# Patient Record
Sex: MALE | Race: WHITE | Employment: FULL TIME | URBAN - METROPOLITAN AREA
[De-identification: names, ages, dates, MRNs, and addresses within clinical notes are randomized per-mention and may not be internally consistent; named-entity substitution may affect disease eponyms.]

---

## 2018-06-08 ENCOUNTER — TELEPHONE (OUTPATIENT)
Dept: FAMILY MEDICINE | Facility: OTHER | Age: 63
End: 2018-06-08

## 2018-06-08 ENCOUNTER — OFFICE VISIT (OUTPATIENT)
Dept: FAMILY MEDICINE | Facility: OTHER | Age: 63
End: 2018-06-08
Payer: COMMERCIAL

## 2018-06-08 VITALS
OXYGEN SATURATION: 96 % | BODY MASS INDEX: 35.1 KG/M2 | HEART RATE: 59 BPM | WEIGHT: 237 LBS | TEMPERATURE: 97.2 F | DIASTOLIC BLOOD PRESSURE: 84 MMHG | SYSTOLIC BLOOD PRESSURE: 122 MMHG | RESPIRATION RATE: 16 BRPM | HEIGHT: 69 IN

## 2018-06-08 DIAGNOSIS — Z23 NEED FOR PROPHYLACTIC VACCINATION WITH TETANUS-DIPHTHERIA (TD): ICD-10-CM

## 2018-06-08 DIAGNOSIS — Z11.59 NEED FOR HEPATITIS C SCREENING TEST: ICD-10-CM

## 2018-06-08 DIAGNOSIS — R97.20 ELEVATED PROSTATE SPECIFIC ANTIGEN (PSA): ICD-10-CM

## 2018-06-08 DIAGNOSIS — I10 BENIGN ESSENTIAL HYPERTENSION: Primary | ICD-10-CM

## 2018-06-08 DIAGNOSIS — E78.5 HYPERLIPIDEMIA LDL GOAL <100: ICD-10-CM

## 2018-06-08 DIAGNOSIS — Z11.4 SCREENING FOR HIV (HUMAN IMMUNODEFICIENCY VIRUS): ICD-10-CM

## 2018-06-08 DIAGNOSIS — Z12.11 SCREEN FOR COLON CANCER: ICD-10-CM

## 2018-06-08 DIAGNOSIS — T78.40XA ALLERGIC STATE, INITIAL ENCOUNTER: ICD-10-CM

## 2018-06-08 DIAGNOSIS — A60.00 HERPES SIMPLEX INFECTION OF GENITOURINARY SYSTEM: ICD-10-CM

## 2018-06-08 LAB
ALBUMIN SERPL-MCNC: 3.7 G/DL (ref 3.4–5)
ALP SERPL-CCNC: 80 U/L (ref 40–150)
ALT SERPL W P-5'-P-CCNC: 56 U/L (ref 0–70)
ANION GAP SERPL CALCULATED.3IONS-SCNC: 8 MMOL/L (ref 3–14)
AST SERPL W P-5'-P-CCNC: 31 U/L (ref 0–45)
BILIRUB SERPL-MCNC: 0.5 MG/DL (ref 0.2–1.3)
BUN SERPL-MCNC: 22 MG/DL (ref 7–30)
CALCIUM SERPL-MCNC: 8.8 MG/DL (ref 8.5–10.1)
CHLORIDE SERPL-SCNC: 107 MMOL/L (ref 94–109)
CHOLEST SERPL-MCNC: 166 MG/DL
CO2 SERPL-SCNC: 28 MMOL/L (ref 20–32)
CREAT SERPL-MCNC: 1.26 MG/DL (ref 0.66–1.25)
ERYTHROCYTE [DISTWIDTH] IN BLOOD BY AUTOMATED COUNT: 14 % (ref 10–15)
GFR SERPL CREATININE-BSD FRML MDRD: 58 ML/MIN/1.7M2
GLUCOSE SERPL-MCNC: 88 MG/DL (ref 70–99)
HCT VFR BLD AUTO: 50 % (ref 40–53)
HDLC SERPL-MCNC: 46 MG/DL
HGB BLD-MCNC: 16.5 G/DL (ref 13.3–17.7)
LDLC SERPL CALC-MCNC: 104 MG/DL
MCH RBC QN AUTO: 30.5 PG (ref 26.5–33)
MCHC RBC AUTO-ENTMCNC: 33 G/DL (ref 31.5–36.5)
MCV RBC AUTO: 92 FL (ref 78–100)
NONHDLC SERPL-MCNC: 120 MG/DL
PLATELET # BLD AUTO: 191 10E9/L (ref 150–450)
POTASSIUM SERPL-SCNC: 4 MMOL/L (ref 3.4–5.3)
PROT SERPL-MCNC: 7.2 G/DL (ref 6.8–8.8)
RBC # BLD AUTO: 5.41 10E12/L (ref 4.4–5.9)
SODIUM SERPL-SCNC: 143 MMOL/L (ref 133–144)
TRIGL SERPL-MCNC: 78 MG/DL
WBC # BLD AUTO: 9.7 10E9/L (ref 4–11)

## 2018-06-08 PROCEDURE — 84154 ASSAY OF PSA FREE: CPT | Mod: 90 | Performed by: FAMILY MEDICINE

## 2018-06-08 PROCEDURE — 99000 SPECIMEN HANDLING OFFICE-LAB: CPT | Performed by: FAMILY MEDICINE

## 2018-06-08 PROCEDURE — 99204 OFFICE O/P NEW MOD 45 MIN: CPT | Performed by: FAMILY MEDICINE

## 2018-06-08 PROCEDURE — 84153 ASSAY OF PSA TOTAL: CPT | Mod: 90 | Performed by: FAMILY MEDICINE

## 2018-06-08 PROCEDURE — 36415 COLL VENOUS BLD VENIPUNCTURE: CPT | Performed by: FAMILY MEDICINE

## 2018-06-08 PROCEDURE — 80053 COMPREHEN METABOLIC PANEL: CPT | Performed by: FAMILY MEDICINE

## 2018-06-08 PROCEDURE — 85027 COMPLETE CBC AUTOMATED: CPT | Performed by: FAMILY MEDICINE

## 2018-06-08 PROCEDURE — 80061 LIPID PANEL: CPT | Performed by: FAMILY MEDICINE

## 2018-06-08 RX ORDER — HYDROCHLOROTHIAZIDE 12.5 MG/1
12.5 CAPSULE ORAL DAILY
Qty: 90 CAPSULE | Refills: 1 | Status: SHIPPED | OUTPATIENT
Start: 2018-06-08 | End: 2018-12-11

## 2018-06-08 RX ORDER — VALACYCLOVIR HYDROCHLORIDE 500 MG/1
500 TABLET, FILM COATED ORAL DAILY
Qty: 90 TABLET | Refills: 3 | Status: SHIPPED | OUTPATIENT
Start: 2018-06-08 | End: 2019-05-07

## 2018-06-08 RX ORDER — HYDROCHLOROTHIAZIDE 12.5 MG/1
12.5 CAPSULE ORAL DAILY
COMMUNITY
Start: 2018-05-14 | End: 2018-06-08

## 2018-06-08 RX ORDER — FLUTICASONE PROPIONATE 50 MCG
1 SPRAY, SUSPENSION (ML) NASAL DAILY
Qty: 1 BOTTLE | Refills: 11 | Status: SHIPPED | OUTPATIENT
Start: 2018-06-08 | End: 2019-05-25

## 2018-06-08 RX ORDER — FLUTICASONE PROPIONATE 50 MCG
1 SPRAY, SUSPENSION (ML) NASAL DAILY
COMMUNITY
Start: 2018-05-23 | End: 2018-06-08

## 2018-06-08 RX ORDER — ATORVASTATIN CALCIUM 40 MG/1
40 TABLET, FILM COATED ORAL DAILY
COMMUNITY
Start: 2011-09-06 | End: 2018-06-08

## 2018-06-08 RX ORDER — VALACYCLOVIR HYDROCHLORIDE 500 MG/1
1 TABLET, FILM COATED ORAL DAILY
COMMUNITY
Start: 2018-04-11 | End: 2018-06-08

## 2018-06-08 RX ORDER — ATORVASTATIN CALCIUM 40 MG/1
40 TABLET, FILM COATED ORAL DAILY
Qty: 90 TABLET | Refills: 1 | Status: SHIPPED | OUTPATIENT
Start: 2018-06-08 | End: 2018-12-14

## 2018-06-08 ASSESSMENT — PAIN SCALES - GENERAL: PAINLEVEL: NO PAIN (0)

## 2018-06-08 NOTE — MR AVS SNAPSHOT
After Visit Summary   6/8/2018    Hossein Diamond    MRN: [id number]           Patient Information     Date Of Birth          1955        Visit Information        Provider Department      6/8/2018 7:40 AM Tanvi Schmidt MD [facility]        Today's Diagnoses     Benign essential hypertension    -  1    Screen for colon cancer        Need for hepatitis C screening test        Screening for HIV (human immunodeficiency virus)        Need for prophylactic vaccination with tetanus-diphtheria (TD)        Hyperlipidemia LDL goal <100        Elevated prostate specific antigen (PSA)        Herpes simplex infection of genitourinary system        Allergic state, initial encounter           Follow-ups after your visit        Additional Services     GASTROENTEROLOGY ADULT REF PROCEDURE ONLY       Last Lab Result: No results found for: CR  Body mass index is 34.94 kg/(m^2).     Needed:  No  Language:  English    Patient will be contacted to schedule procedure.     Please be aware that coverage of these services is subject to the terms and limitations of your health insurance plan.  Call member services at your health plan with any benefit or coverage questions.  Any procedures must be performed at a [facility] facility OR coordinated by your clinic's referral office.    Please bring the following with you to your appointment:    (1) Any X-Rays, CTs or MRIs which have been performed.  Contact the facility where they were done to arrange for  prior to your scheduled appointment.    (2) List of current medications   (3) This referral request   (4) Any documents/labs given to you for this referral                  Follow-up notes from your care team     Return in about 6 months (around 12/8/2018).      Who to contact     If you have questions or need follow up information about today's clinic visit or your schedule please [facility] [place] directly at  "[phone].  Normal or non-critical lab and imaging results will be communicated to you by MyChart, letter or phone within 4 business days after the clinic has received the results. If you do not hear from us within 7 days, please contact the clinic through [facility] [employer] or phone. If you have a critical or abnormal lab result, we will notify you by phone as soon as possible.  Submit refill requests through [employer] or call your pharmacy and they will forward the refill request to us. Please allow 3 business days for your refill to be completed.          Additional Information About Your Visit        [facility] [employer] Information     [employer] lets you send messages to your doctor, view your test results, renew your prescriptions, schedule appointments and more. To sign up, go to [email or web address] . Click on \"Log in\" on the left side of the screen, which will take you to the Welcome page. Then click on \"Sign up Now\" on the right side of the page.     You will be asked to enter the access code listed below, as well as some personal information. Please follow the directions to create your username and password.     Your access code is: [email or web address]  Expires: 2018  7:19 AM     Your access code will  in 90 days. If you need help or a new code, please call your [place] clinic or [phone].        Care EveryWhere ID     This is your Care EveryWhere ID. This could be used by other organizations to access your [place] medical records  [id number]        Your Vitals Were     Pulse Temperature Respirations Height Pulse Oximetry BMI (Body Mass Index)    59 97.2  F (36.2  C) (Temporal) 16 5' 9.05\" (1.754 m) 96% 34.94 kg/m2       Blood Pressure from Last 3 Encounters:   18 122/84    Weight from Last 3 Encounters:   18 237 lb (107.5 kg)              We Performed the Following     CBC with platelets     Comprehensive metabolic panel (BMP + Alb, Alk Phos, ALT, AST, Total. Bili, TP)     GASTROENTEROLOGY ADULT REF " PROCEDURE ONLY     Lipid panel reflex to direct LDL Fasting     PSA, total and free          Where to get your medicines      These medications were sent to [place] [address] [place] [facility] [place] - [address]  [address] [facility], [place] [address]     Phone:  [phone]     atorvastatin 40 MG tablet    fluticasone 50 MCG/ACT spray    hydrochlorothiazide 12.5 MG capsule    valACYclovir 500 MG tablet          Primary Care Provider Office Phone # Fax #    Tanvi Schmidt [phone]       [address]  [facility] [address]        Equal Access to Services     [facility]: Hadii clotilde vance hadjuanpablo Soviolet, waaxda lugage, qaybta kaalmada radha, hero george . So [facility] [phone].    ATENCIÓN: Si habla español, tiene a chaves disposición servicios gratuitos de asistencia lingüística. [facility] [phone].    We comply with applicable federal civil rights laws and [place] laws. We do not discriminate on the basis of race, color, national origin, age, disability, sex, sexual orientation, or gender identity.            Thank you!     Thank you for choosing [facility]  for your care. Our goal is always to provide you with excellent care. Hearing back from our patients is one way we can continue to improve our services. Please take a few minutes to complete the written survey that you may receive in the mail after your visit with us. Thank you!             Your Updated Medication List - Protect others around you: Learn how to safely use, store and throw away your medicines at www.disposemymeds.org.          This list is accurate as of 6/8/18  8:10 AM.  Always use your most recent med list.                   Brand Name Dispense Instructions for use Diagnosis    aspirin 325 MG EC tablet      Take 325 mg by mouth daily        atorvastatin 40 MG tablet    LIPITOR    90 tablet    Take 1 tablet (40 mg) by mouth daily    Hyperlipidemia LDL goal <100        fluticasone 50 MCG/ACT spray    FLONASE    1 Bottle    Spray 1 spray in nostril daily    Allergic state, initial encounter       hydrochlorothiazide 12.5 MG capsule    MICROZIDE    90 capsule    Take 1 capsule (12.5 mg) by mouth daily    Benign essential hypertension       valACYclovir 500 MG tablet    VALTREX    90 tablet    Take 1 tablet (500 mg) by mouth daily    Herpes simplex infection of genitourinary system

## 2018-06-08 NOTE — TELEPHONE ENCOUNTER
LM for patient to return phone call to clinic about message below.  Marilee Napoles CMA ([facility])      Notes Recorded by Tanvi Schmidt MD on 6/8/2018 at 3:56 PM  Please inform patient of the labs show mildly reduced kidney function.  The liver enzymes are completely normal.  Cholesterol levels are essentially .Normal.  No changes in medications at this point.  Please advise to avoid any over-the-counter ibuprofen or Aleve and advised adequate hydration to prevent any kidney damage.  Will discuss medication changes at the next visit.

## 2018-06-08 NOTE — ASSESSMENT & PLAN NOTE
History of elevated PSA. Has never seen urology before. Recheck levels. Denies any prostate related symptoms. If significantly elevated will consider referral to urology

## 2018-06-08 NOTE — PROGRESS NOTES
SUBJECTIVE:   Hossein Diamond is a 63 year old male who presents to clinic today for the following health issues:      History of Present Illness     Hypertension:     Outpatient blood pressures:  Are not being checked    Dietary sodium intake::  Not monitoring salt intake    Diet:  Breakfast skipped  Frequency of exercise:  4-5 days/week  Duration of exercise:  Greater than 60 minutes  Taking medications regularly:  No  Barriers to taking medications:  None  Medication side effects:  Muscle aches  Additional concerns today:  YES    Answers for HPI/ROS submitted by the patient on 6/8/2018   PHQ-2 Score: 0      Problem list and histories reviewed & adjusted, as indicated.  Additional history: as documented        Patient Active Problem List   Diagnosis     Elevated prostate specific antigen (PSA)     Hyperlipidemia LDL goal <100     Benign essential hypertension     Herpes simplex infection of genitourinary system     History reviewed. No pertinent surgical history.    Social History   Substance Use Topics     Smoking status: Never Smoker     Smokeless tobacco: Never Used     Alcohol use No     Family History   Problem Relation Age of Onset     Hypertension Mother      HEART DISEASE Father      HEART DISEASE Brother          Current Outpatient Prescriptions   Medication Sig Dispense Refill     aspirin 325 MG EC tablet Take 325 mg by mouth daily       atorvastatin (LIPITOR) 40 MG tablet Take 1 tablet (40 mg) by mouth daily 90 tablet 1     fluticasone (FLONASE) 50 MCG/ACT spray Spray 1 spray in nostril daily 1 Bottle 11     hydrochlorothiazide (MICROZIDE) 12.5 MG capsule Take 1 capsule (12.5 mg) by mouth daily 90 capsule 1     valACYclovir (VALTREX) 500 MG tablet Take 1 tablet (500 mg) by mouth daily 90 tablet 3     [DISCONTINUED] atorvastatin (LIPITOR) 40 MG tablet Take 40 mg by mouth daily       [DISCONTINUED] hydrochlorothiazide (MICROZIDE) 12.5 MG capsule Take 12.5 mg by mouth daily       [DISCONTINUED]  "valACYclovir (VALTREX) 500 MG tablet Take 1 tablet by mouth daily       No Known Allergies  No lab results found.   BP Readings from Last 3 Encounters:   06/08/18 122/84    Wt Readings from Last 3 Encounters:   06/08/18 237 lb (107.5 kg)                  Labs reviewed in EPIC    ROS:  Constitutional, HEENT, cardiovascular, pulmonary, GI, , musculoskeletal, neuro, skin, endocrine and psych systems are negative, except as otherwise noted.    OBJECTIVE:     /84 (BP Location: Right arm, Patient Position: Chair, Cuff Size: Adult Regular)  Pulse 59  Temp 97.2  F (36.2  C) (Temporal)  Resp 16  Ht 5' 9.05\" (1.754 m)  Wt 237 lb (107.5 kg)  SpO2 96%  BMI 34.94 kg/m2  Body mass index is 34.94 kg/(m^2).   Physical Exam   Constitutional: He appears well-developed and well-nourished.   HENT:   Head: Normocephalic and atraumatic.   Right Ear: External ear normal.   Left Ear: External ear normal.   Mouth/Throat: Oropharynx is clear and moist.   Eyes: EOM are normal.   Neck: Neck supple.   Cardiovascular: Normal rate, regular rhythm, normal heart sounds and intact distal pulses.  Exam reveals no gallop.    No murmur heard.  Pulmonary/Chest: Effort normal and breath sounds normal. No respiratory distress. He has no wheezes. He has no rales. He exhibits no tenderness.   Psychiatric: He has a normal mood and affect. His behavior is normal.         Diagnostic Test Results:  none     ASSESSMENT/PLAN:     Problem List Items Addressed This Visit     Elevated prostate specific antigen (PSA)     History of elevated PSA. Has never seen urology before. Recheck levels. Denies any prostate related symptoms. If significantly elevated will consider referral to urology         Relevant Orders    PSA, total and free    Hyperlipidemia LDL goal <100     Recheck lipids  Continue lipitor-40mg         Relevant Medications    atorvastatin (LIPITOR) 40 MG tablet    Other Relevant Orders    Comprehensive metabolic panel (BMP + Alb, Alk Phos, " ALT, AST, Total. Bili, TP)    Benign essential hypertension - Primary     KEON from [facility]  Well controlled  Refill Hct[facility]  Recheck chemistries             Relevant Medications    hydrochlorothiazide (MICROZIDE) 12.5 MG capsule    Other Relevant Orders    Lipid panel reflex to direct LDL Fasting    Comprehensive metabolic panel (BMP + Alb, Alk Phos, ALT, AST, Total. Bili, TP)    CBC with platelets    Herpes simplex infection of genitourinary system     On chronic suppressive prophylaxis with valtrex  Refill meds         Relevant Medications    valACYclovir (VALTREX) 500 MG tablet      Other Visit Diagnoses     Screen for colon cancer        Relevant Orders    GASTROENTEROLOGY ADULT REF PROCEDURE ONLY    Need for hepatitis C screening test        Screening for HIV (human immunodeficiency virus)        Need for prophylactic vaccination with tetanus-diphtheria (TD)        Allergic state, initial encounter        Relevant Medications    fluticasone (FLONASE) 50 MCG/ACT spray           Tanvi Schmidt MD  [facility]

## 2018-06-09 LAB
PSA FREE MFR SERPL: 22 %
PSA FREE SERPL-MCNC: 1.8 NG/ML
PSA SERPL-MCNC: 8 NG/ML (ref 0–4)

## 2018-06-11 NOTE — PROGRESS NOTES
RK is out of the office. The PSA test came back elevated. Looking at care everywhere this is less elevated than last year, which is a bit reassurign. But usually at these levels we have had the assistance of urology to discuss and decide on plan. Please still consider urology referral as per RK's last note (if significantly elevated, plan urology referral)  Anahi Lopez MD

## 2018-06-12 ENCOUNTER — TELEPHONE (OUTPATIENT)
Dept: FAMILY MEDICINE | Facility: OTHER | Age: 63
End: 2018-06-12

## 2018-06-12 NOTE — TELEPHONE ENCOUNTER
Called patient to schedule a colonoscopy. Patient declines to schedule and states they will call when they are ready. Phone number provided to the patient.

## 2018-11-29 DIAGNOSIS — A60.00 HERPES SIMPLEX INFECTION OF GENITOURINARY SYSTEM: ICD-10-CM

## 2018-11-29 RX ORDER — VALACYCLOVIR HYDROCHLORIDE 500 MG/1
500 TABLET, FILM COATED ORAL DAILY
Qty: 90 TABLET | Refills: 3 | Status: CANCELLED | OUTPATIENT
Start: 2018-11-29

## 2018-11-29 NOTE — TELEPHONE ENCOUNTER
"Requested Prescriptions   Pending Prescriptions Disp Refills     valACYclovir (VALTREX) 500 MG tablet 90 tablet 3     Sig: Take 1 tablet (500 mg) by mouth daily    Antivirals for Herpes Protocol Failed    11/29/2018 11:15 AM       Failed - Normal serum creatinine on file in past 12 months    Recent Labs   Lab Test  06/08/18   0821   CR  1.26*          Passed - Patient is age 12 or older       Passed - Recent (12 mo) or future (30 days) visit within the authorizing provider's specialty    Patient had office visit in the last 12 months or has a visit in the next 30 days with authorizing provider or within the authorizing provider's specialty.  See \"Patient Info\" tab in inbasket, or \"Choose Columns\" in Meds & Orders section of the refill encounter.          valACYclovir (VALTREX) 500 MG tablet  Rx was sent 06/08/2018 for 90 tabs and 3 refills.   Pharmacy notified via E-prescribe refusal.  Jessica Candelario, RN, BSN       "

## 2018-12-06 DIAGNOSIS — I10 BENIGN ESSENTIAL HYPERTENSION: ICD-10-CM

## 2018-12-06 RX ORDER — HYDROCHLOROTHIAZIDE 12.5 MG/1
12.5 CAPSULE ORAL DAILY
Qty: 90 CAPSULE | Refills: 1 | Status: CANCELLED | OUTPATIENT
Start: 2018-12-06

## 2018-12-07 NOTE — TELEPHONE ENCOUNTER
Hydrochlorothiazide:  Routing refill request to provider for review/approval because:  Labs out of range:  Creatinine    Jazmine Morrison, RN, BSN

## 2018-12-07 NOTE — TELEPHONE ENCOUNTER
Called patient to let him know he needed a physical. Patient said he will call on Monday to schedule.    Teresa Frank MA

## 2018-12-11 DIAGNOSIS — I10 BENIGN ESSENTIAL HYPERTENSION: ICD-10-CM

## 2018-12-12 RX ORDER — HYDROCHLOROTHIAZIDE 12.5 MG/1
12.5 CAPSULE ORAL DAILY
Qty: 90 CAPSULE | Refills: 1 | Status: SHIPPED | OUTPATIENT
Start: 2018-12-12 | End: 2019-05-21

## 2018-12-12 NOTE — TELEPHONE ENCOUNTER
"Requested Prescriptions   Pending Prescriptions Disp Refills     hydrochlorothiazide (MICROZIDE) 12.5 MG capsule [Pharmacy Med Name: HYDROCHLOROTHIAZIDE 12.5 MG CP] 90 capsule 1     Sig: TAKE 1 CAPSULE (12.5 MG) BY MOUTH DAILY    Diuretics (Including Combos) Protocol Failed - 12/11/2018 11:03 AM       Failed - Normal serum creatinine on file in past 12 months    Recent Labs   Lab Test 06/08/18  0821   CR 1.26*          Passed - Blood pressure under 140/90 in past 12 months    BP Readings from Last 3 Encounters:   06/08/18 122/84          Passed - Recent (12 mo) or future (30 days) visit within the authorizing provider's specialty    Patient had office visit in the last 12 months or has a visit in the next 30 days with authorizing provider or within the authorizing provider's specialty.  See \"Patient Info\" tab in inbasket, or \"Choose Columns\" in Meds & Orders section of the refill encounter.         Passed - Patient is age 18 or older       Passed - Normal serum potassium on file in past 12 months    Recent Labs   Lab Test 06/08/18  0821   POTASSIUM 4.0          Passed - Normal serum sodium on file in past 12 months    Recent Labs   Lab Test 06/08/18  0821              Last OV 06/08/2018  Last filled 06/08/2018    Prescription approved per [facility] Refill Protocol.  Abdi Berumen, RN, BSN          "

## 2018-12-14 DIAGNOSIS — E78.5 HYPERLIPIDEMIA LDL GOAL <100: ICD-10-CM

## 2018-12-14 RX ORDER — ATORVASTATIN CALCIUM 40 MG/1
40 TABLET, FILM COATED ORAL DAILY
Qty: 90 TABLET | Refills: 1 | Status: SHIPPED | OUTPATIENT
Start: 2018-12-14 | End: 2019-05-21

## 2018-12-14 NOTE — TELEPHONE ENCOUNTER
Lipitor    Prescription approved per [facility] Refill Protocol.    Leticia Curtis, RN, BSN

## 2018-12-17 ENCOUNTER — OFFICE VISIT (OUTPATIENT)
Dept: URGENT CARE | Facility: RETAIL CLINIC | Age: 63
End: 2018-12-17
Payer: COMMERCIAL

## 2018-12-17 VITALS
SYSTOLIC BLOOD PRESSURE: 142 MMHG | TEMPERATURE: 98.7 F | DIASTOLIC BLOOD PRESSURE: 81 MMHG | OXYGEN SATURATION: 95 % | HEART RATE: 64 BPM

## 2018-12-17 DIAGNOSIS — H61.23 EXCESSIVE CERUMEN IN BOTH EAR CANALS: ICD-10-CM

## 2018-12-17 DIAGNOSIS — J06.9 VIRAL URI WITH COUGH: Primary | ICD-10-CM

## 2018-12-17 PROCEDURE — 99203 OFFICE O/P NEW LOW 30 MIN: CPT | Performed by: PHYSICIAN ASSISTANT

## 2018-12-17 NOTE — PROGRESS NOTES
Chief Complaint   Patient presents with     Sinus Problem     not feeling well 10-12 days     Fever     low, off and on     Throat Problem     drainage     Cough     due to drainage      SUBJECTIVE:  Hossein Diamond is a 63 year old male here with concerns about congestion and cough.  He states onset of symptoms were 10 day(s) ago. Started with not feeling well. Cough in past week, some drainage into throat. Low grade temp on and off.  He Mild  Current and Associated symptoms: cough, drainage, low grade temps  Predisposing factors include none.   Recent treatment has included:on and off flonase  PMH: HTN, hyperlipidemia    Past Medical History:   Diagnosis Date     Benign essential hypertension 6/8/2018     Hyperlipidemia LDL goal <100 6/8/2018     Current Outpatient Medications   Medication Sig Dispense Refill     aspirin 325 MG EC tablet Take 325 mg by mouth daily       atorvastatin (LIPITOR) 40 MG tablet Take 1 tablet (40 mg) by mouth daily 90 tablet 1     fluticasone (FLONASE) 50 MCG/ACT spray Spray 1 spray in nostril daily 1 Bottle 11     hydrochlorothiazide (MICROZIDE) 12.5 MG capsule TAKE 1 CAPSULE (12.5 MG) BY MOUTH DAILY 90 capsule 1     valACYclovir (VALTREX) 500 MG tablet Take 1 tablet (500 mg) by mouth daily 90 tablet 3      No Known Allergies     History   Smoking Status     Never Smoker   Smokeless Tobacco     Never Used       ROS:  CONSTITUTIONAL:POSITIVE  for low grade temps intermittent   ENT/MOUTH: POSITIVE for drainage  and NEGATIVE for ear pain, sinus pressure  RESP:POSITIVE for cough and NEGATIVE for wheezing    OBJECTIVE:  /81 (BP Location: Left arm)   Pulse 64   Temp 98.7  F (37.1  C) (Oral)   SpO2 95%   Exam:GENERAL APPEARANCE: healthy, alert and no distress  EYES:  conjunctiva clear  HENT: ear canals excessive cerumen. R TM able to visualize small portion - gray. Unable to visualize L TM Nose boggy, pink. mouth without ulcers, erythema or lesions  NECK: supple, nontender, no  lymphadenopathy  RESP: lungs clear to auscultation - no rales, rhonchi or wheezes  CV: regular rates and rhythm, normal S1 S2, no murmur noted  SKIN: no suspicious lesions or rashes    ASSESSMENT:  (J06.9,  B97.89) Viral URI with cough  (primary encounter diagnosis)  (H61.23) Excessive cerumen in both ear canals    PLAN:  Viral chest colds can last for several weeks  No indication for antibiotics discussed.    Rest! Your body needs more rest to heal.  Drink plenty of fluids (warm fluids like tea or soup are soothing and reduce cough)  Saline nasal spray frequently daily  Flonase (steroid nasal spray) 1 time daily  Sit in the bathroom with a hot shower running and breathe in the steam.  Honey may soothe your sore throat and help manage your cough- may take straight or in warm water with lemon juice.  Avoid smoke from cigarettes, fireplace, bonfire etc.  Take Tylenol as needed for pain.  Delsym (dextromethorphan) is a 12 hour over the counter cough medication    OR Mucinex or Robitussin (guiafenesin) thin mucus and may help it to loosen more quickly  Good handwashing is the best way to prevent spread of the common cold.  Present to emergency room if you develop trouble breathing, swallowing or cough-up blood.  Follow up with your primary care provider if symptoms worsen or fail to improve as expected    For bilateral cerumen:   Debrox as directed on bottle: Use in both ears and can return for ear lavage after 3 days use of Debrox.    Dionna Rees PA-C  [facility]

## 2018-12-17 NOTE — PATIENT INSTRUCTIONS
Viral chest colds can last for several weeks  No indication for antibiotics discussed.    Rest! Your body needs more rest to heal.  Drink plenty of fluids (warm fluids like tea or soup are soothing and reduce cough)  Saline nasal spray frequently daily  Flonase (steroid nasal spray) 1 time daily  Sit in the bathroom with a hot shower running and breathe in the steam.  Honey may soothe your sore throat and help manage your cough- may take straight or in warm water with lemon juice.  Avoid smoke from cigarettes, fireplace, bonfire etc.  Take Tylenol as needed for pain.  Delsym (dextromethorphan) is a 12 hour over the counter cough medication    OR Mucinex or Robitussin (guiafenesin) thin mucus and may help it to loosen more quickly  Good handwashing is the best way to prevent spread of the common cold.  Present to emergency room if you develop trouble breathing, swallowing or cough-up blood.  Follow up with your primary care provider if symptoms worsen or fail to improve as expected    Debrox as directed on bottle  - both ears and can return for ear lavage after 3 days after using Debrox

## 2019-05-07 DIAGNOSIS — A60.00 HERPES SIMPLEX INFECTION OF GENITOURINARY SYSTEM: ICD-10-CM

## 2019-05-07 NOTE — LETTER
[address]   [facility] [address]  Phone: [phone]    05/09/19        Hossein Diamond  [address] [address] [place] [address]          Hossein,  We have been trying to reach you and have been unsuccessful.  We recently received a refill request for your Valtrex, we have given a jose refill due to seeing you in clinic for an office visit.    Please call us at [phone] to assist with scheduling an appointment.  Teresa Frank MA

## 2019-05-08 RX ORDER — VALACYCLOVIR HYDROCHLORIDE 500 MG/1
TABLET, FILM COATED ORAL
Qty: 30 TABLET | Refills: 0 | Status: SHIPPED | OUTPATIENT
Start: 2019-05-08

## 2019-05-08 NOTE — TELEPHONE ENCOUNTER
Medication is being filled for 1 time refill only due to:  Patient needs to be seen because it has been more than one year since last visit.     No future office visit scheduled.   Please call and help schedule.  Thank you!  Abdi Berumen RN, BSN

## 2019-05-09 NOTE — TELEPHONE ENCOUNTER
Patient called clinic back. I relayed message below about scheduling for further refills. Patient said he will keep that in mind and call back at a later date.

## 2019-05-21 DIAGNOSIS — E78.5 HYPERLIPIDEMIA LDL GOAL <100: ICD-10-CM

## 2019-05-21 DIAGNOSIS — I10 BENIGN ESSENTIAL HYPERTENSION: ICD-10-CM

## 2019-05-21 RX ORDER — ATORVASTATIN CALCIUM 40 MG/1
TABLET, FILM COATED ORAL
Qty: 30 TABLET | Refills: 0 | Status: SHIPPED | OUTPATIENT
Start: 2019-05-21

## 2019-05-21 RX ORDER — HYDROCHLOROTHIAZIDE 12.5 MG/1
12.5 CAPSULE ORAL DAILY
Qty: 30 CAPSULE | Refills: 0 | Status: SHIPPED | OUTPATIENT
Start: 2019-05-21

## 2019-05-21 NOTE — TELEPHONE ENCOUNTER
hydrochlorothiazide & Lipitor  Routing refill request to provider for review/approval because:  Patient needs to be seen because:  Overdue for OV    Jazmine Morrison, RN, BSN

## 2019-05-25 DIAGNOSIS — T78.40XA ALLERGIC STATE, INITIAL ENCOUNTER: ICD-10-CM

## 2019-05-28 RX ORDER — FLUTICASONE PROPIONATE 50 MCG
1 SPRAY, SUSPENSION (ML) NASAL DAILY
Qty: 16 ML | Refills: 0 | Status: SHIPPED | OUTPATIENT
Start: 2019-05-28

## 2019-05-28 NOTE — TELEPHONE ENCOUNTER
Flonase  Routing refill request to provider for review/approval because:  Patient needs to be seen because: was due for visit in December    Jazmine Morrison RN, BSN

## 2019-12-02 ENCOUNTER — OFFICE VISIT (OUTPATIENT)
Dept: URGENT CARE | Facility: RETAIL CLINIC | Age: 64
End: 2019-12-02
Payer: COMMERCIAL

## 2019-12-02 VITALS
OXYGEN SATURATION: 97 % | HEART RATE: 65 BPM | SYSTOLIC BLOOD PRESSURE: 145 MMHG | TEMPERATURE: 97 F | DIASTOLIC BLOOD PRESSURE: 90 MMHG | RESPIRATION RATE: 16 BRPM

## 2019-12-02 DIAGNOSIS — H66.002 ACUTE SUPPURATIVE OTITIS MEDIA OF LEFT EAR WITHOUT SPONTANEOUS RUPTURE OF TYMPANIC MEMBRANE, RECURRENCE NOT SPECIFIED: Primary | ICD-10-CM

## 2019-12-02 DIAGNOSIS — T78.40XA ALLERGIC STATE, INITIAL ENCOUNTER: ICD-10-CM

## 2019-12-02 DIAGNOSIS — H60.92 OTITIS EXTERNA OF LEFT EAR, UNSPECIFIED CHRONICITY, UNSPECIFIED TYPE: ICD-10-CM

## 2019-12-02 PROCEDURE — 99213 OFFICE O/P EST LOW 20 MIN: CPT | Performed by: PHYSICIAN ASSISTANT

## 2019-12-02 RX ORDER — OFLOXACIN 3 MG/ML
5 SOLUTION AURICULAR (OTIC) 2 TIMES DAILY
Qty: 1 BOTTLE | Refills: 0 | Status: SHIPPED | OUTPATIENT
Start: 2019-12-02 | End: 2019-12-09

## 2019-12-02 RX ORDER — CEFUROXIME AXETIL 250 MG/1
250 TABLET ORAL 2 TIMES DAILY
Qty: 14 TABLET | Refills: 0 | Status: SHIPPED | OUTPATIENT
Start: 2019-12-02 | End: 2019-12-09

## 2019-12-02 RX ORDER — OMEGA-3 FATTY ACIDS/FISH OIL 300-1000MG
200 CAPSULE ORAL EVERY 4 HOURS PRN
COMMUNITY

## 2019-12-02 RX ORDER — ACETAMINOPHEN 325 MG/1
325-650 TABLET ORAL EVERY 6 HOURS PRN
COMMUNITY

## 2019-12-02 ASSESSMENT — PAIN SCALES - GENERAL: PAINLEVEL: MILD PAIN (2)

## 2019-12-02 NOTE — PROGRESS NOTES
Chief Complaint   Patient presents with     Otalgia     left ear 10-12 days.  Worse today-feels like a headache in the ear      SUBJECTIVE:  Hossein Diamond is a 64 year old male who presents with left ear pain for 10-12 day(s).   Severity: moderate   Timing:still present, worse today  Additional symptoms include feels like a headache, slightly muffled in ear  History of recurrent otitis: no  Taking tylenol for ear pain    Past Medical History:   Diagnosis Date     Benign essential hypertension 6/8/2018     Hyperlipidemia LDL goal <100 6/8/2018     Current Outpatient Medications   Medication Sig Dispense Refill     acetaminophen (TYLENOL) 325 MG tablet Take 325-650 mg by mouth every 6 hours as needed for mild pain       aspirin 325 MG EC tablet Take 325 mg by mouth daily       atorvastatin (LIPITOR) 40 MG tablet TAKE 1 TABLET BY MOUTH EVERY DAY 30 tablet 0     cefuroxime (CEFTIN) 250 MG tablet Take 1 tablet (250 mg) by mouth 2 times daily for 7 days 14 tablet 0     fluticasone (FLONASE) 50 MCG/ACT nasal spray SPRAY 1 SPRAY IN NOSTRIL DAILY 16 mL 0     hydrochlorothiazide (MICROZIDE) 12.5 MG capsule TAKE 1 CAPSULE (12.5 MG) BY MOUTH DAILY 30 capsule 0     ibuprofen (ADVIL/MOTRIN) 200 MG capsule Take 200 mg by mouth every 4 hours as needed for fever       ofloxacin (FLOXIN) 0.3 % otic solution Place 5 drops Into the left ear 2 times daily for 7 days Can sub ophthalmic if needed 1 Bottle 0     valACYclovir (VALTREX) 500 MG tablet TAKE 1 TABLET BY MOUTH EVERY DAY 30 tablet 0     History   Smoking Status     Never Smoker   Smokeless Tobacco     Never Used       ROS:   CONSTITUTIONAL:NEGATIVE for fever, chills  ENT/MOUTH: POSITIVE for ear pain left, very slight sore throat and congestion  RESP:NEGATIVE for significant cough or wheezing    OBJECTIVE:  BP (!) 145/90   Pulse 65   Temp 97  F (36.1  C) (Temporal)   Resp 16   SpO2 97%   The right TM is normal: no effusions, no erythema, and normal landmarks     The right  auditory canal is normal and without drainage, edema or erythema  The left TM is bulging and erythematous  The left auditory canal is erythematous, swollen and tender  Oropharynx exam is normal: no lesions, erythema, adenopathy or exudate.  GENERAL: no acute distress  EYES: conjunctiva clear  NECK: supple, non-tender to palpation, no adenopathy noted    ASSESSMENT:  (H66.002) Acute suppurative otitis media of left ear without spontaneous rupture of tympanic membrane, recurrence not specified  (primary encounter diagnosis)  (H60.92) Otitis externa of left ear, unspecified chronicity, unspecified type    PLAN:  Plan: ofloxacin (FLOXIN) 0.3 % otic solution  Plan: cefuroxime (CEFTIN) 250 MG tablet  Patient Instructions   Take oral antibiotic 2x a day as directed for 7 days   Place antibiotic drops in ear as directed  Can take over the counter pain reliever as needed such as tylenol or ibuprofen.  Can apply heating compress to ear to soothe and help with discomfort.  Keep water out of ear until the infection is cleared.   Protect the ear by placing a cotton ball coated with petroleum jelly in the ear canal when showering or bathing to keep water out of the ear.  Best to avoid swimming with an acute outer ear infection.  Avoid water sports for 7-10 days.  Avoid using Qtips in ear.  ear buds should not be worn until pain and/or discharge have gone away.  Please follow up with primary care provider if not improving, worsening or new symptoms or for any adverse reactions to medications.       Dionna Rees PA-C  [facility]

## 2019-12-03 RX ORDER — FLUTICASONE PROPIONATE 50 MCG
SPRAY, SUSPENSION (ML) NASAL
Qty: 16 ML | Refills: 0 | OUTPATIENT
Start: 2019-12-03

## 2019-12-03 NOTE — PATIENT INSTRUCTIONS
Take oral antibiotic 2x a day as directed for 7 days   Place antibiotic drops in ear as directed  Can take over the counter pain reliever as needed such as tylenol or ibuprofen.  Can apply heating compress to ear to soothe and help with discomfort.  Keep water out of ear until the infection is cleared.   Protect the ear by placing a cotton ball coated with petroleum jelly in the ear canal when showering or bathing to keep water out of the ear.  Best to avoid swimming with an acute outer ear infection.  Avoid water sports for 7-10 days.  Avoid using Qtips in ear.  ear buds should not be worn until pain and/or discharge have gone away.  Please follow up with primary care provider if not improving, worsening or new symptoms or for any adverse reactions to medications.

## 2019-12-03 NOTE — TELEPHONE ENCOUNTER
Routing refill request to provider for review/approval because:  Patient needs to be seen because it has been more than 1 year since last office visit.    Jazmine Morrison, RN, BSN